# Patient Record
Sex: MALE | Race: WHITE | NOT HISPANIC OR LATINO
[De-identification: names, ages, dates, MRNs, and addresses within clinical notes are randomized per-mention and may not be internally consistent; named-entity substitution may affect disease eponyms.]

---

## 2023-03-20 PROBLEM — Z00.00 ENCOUNTER FOR PREVENTIVE HEALTH EXAMINATION: Status: ACTIVE | Noted: 2023-03-20

## 2023-03-21 ENCOUNTER — NON-APPOINTMENT (OUTPATIENT)
Age: 63
End: 2023-03-21

## 2023-03-21 ENCOUNTER — APPOINTMENT (OUTPATIENT)
Dept: HEART AND VASCULAR | Facility: CLINIC | Age: 63
End: 2023-03-21
Payer: COMMERCIAL

## 2023-03-21 VITALS
SYSTOLIC BLOOD PRESSURE: 130 MMHG | WEIGHT: 186 LBS | TEMPERATURE: 98.7 F | HEART RATE: 88 BPM | OXYGEN SATURATION: 98 % | DIASTOLIC BLOOD PRESSURE: 90 MMHG

## 2023-03-21 DIAGNOSIS — I25.10 ATHEROSCLEROTIC HEART DISEASE OF NATIVE CORONARY ARTERY W/OUT ANGINA PECTORIS: ICD-10-CM

## 2023-03-21 DIAGNOSIS — R60.9 EDEMA, UNSPECIFIED: ICD-10-CM

## 2023-03-21 PROCEDURE — 99204 OFFICE O/P NEW MOD 45 MIN: CPT | Mod: 25

## 2023-03-21 PROCEDURE — 93000 ELECTROCARDIOGRAM COMPLETE: CPT

## 2023-03-21 RX ORDER — SIMVASTATIN 10 MG/1
10 TABLET, FILM COATED ORAL
Refills: 0 | Status: ACTIVE | COMMUNITY

## 2023-03-21 RX ORDER — FUROSEMIDE 40 MG/1
40 TABLET ORAL
Qty: 30 | Refills: 2 | Status: ACTIVE | COMMUNITY
Start: 2023-03-21 | End: 1900-01-01

## 2023-03-27 NOTE — DISCUSSION/SUMMARY
[FreeTextEntry1] : 63 y/o M with PMHx of HLD, ?Hypothyroidism, isolated episodes of hypertension, mostly related with increased sodium intake (first occurred ~ 40 years ago)\par \par # HTN\par Diet controlled\par BPs at goal this visit, however patient states that his BPs very sensitive to salt intake and he get fluid overloaded. Echo in past as per patient has been normal\par Will consider Renal consult to r/o secondary HTN causes\par \par # HLD\par Continue Statin\par \par # ASCVD\par CT Ca score ordered to assess risk\par Will discuss need for Statin post test [EKG obtained to assist in diagnosis and management of assessed problem(s)] : EKG obtained to assist in diagnosis and management of assessed problem(s)

## 2023-03-27 NOTE — HISTORY OF PRESENT ILLNESS
[FreeTextEntry1] : 61 y/o M with PMHx of HLD, ?Hypothyroidism, isolated episodes of hypertension, mostly related with increased sodium intake (first occurred ~ 40 years ago)\par \par Here for cardiac consultation and further management\par Also concerned about his ASCVD risk\par \par EKG 3/21/2023: NSR, HR 73\par \par Lipids 3/2023: , HDL 44, Total Chol 186\par \par Patient states he is very active, exercises with  3x a week with high intensity\par Denies CP, SOB, LE edema\par Can get bloated / volume overloaded when he has too much salt, has increase diuresis after

## 2023-03-27 NOTE — REVIEW OF SYSTEMS
[Headache] : headache [Negative] : Heme/Lymph [SOB] : no shortness of breath [Dyspnea on exertion] : not dyspnea during exertion [Chest Discomfort] : no chest discomfort [Lower Ext Edema] : no extremity edema [Leg Claudication] : no intermittent leg claudication [Palpitations] : no palpitations [Orthopnea] : no orthopnea [PND] : no PND [Syncope] : no syncope

## 2023-03-28 ENCOUNTER — NON-APPOINTMENT (OUTPATIENT)
Age: 63
End: 2023-03-28

## 2023-03-29 ENCOUNTER — NON-APPOINTMENT (OUTPATIENT)
Age: 63
End: 2023-03-29